# Patient Record
Sex: MALE | Race: WHITE | NOT HISPANIC OR LATINO | Employment: OTHER | URBAN - METROPOLITAN AREA
[De-identification: names, ages, dates, MRNs, and addresses within clinical notes are randomized per-mention and may not be internally consistent; named-entity substitution may affect disease eponyms.]

---

## 2019-05-23 ENCOUNTER — TELEPHONE (OUTPATIENT)
Dept: PHARMACY | Facility: CLINIC | Age: 64
End: 2019-05-23

## 2019-05-23 NOTE — TELEPHONE ENCOUNTER
Informed Patient  that [facility] received prescription for Mavyret and prior authorization is required.  [facility] will be back in touch once insurance determination is received.  Patient reports that he has already been in touch with Mavyret PAP program as well.

## 2019-08-05 ENCOUNTER — TELEPHONE (OUTPATIENT)
Dept: PHARMACY | Facility: CLINIC | Age: 64
End: 2019-08-05

## 2019-08-08 NOTE — TELEPHONE ENCOUNTER
DOCUMENTATION ONLY:  Prior authorization for AG Epclusa does not required prior authorization with insurance company.     Co-pay: $0    Patient Assistance IS NOT required.     Forward to clinical pharmacist for consult & shipment.

## 2019-08-19 ENCOUNTER — TELEPHONE (OUTPATIENT)
Dept: PHARMACY | Facility: CLINIC | Age: 64
End: 2019-08-19

## 2019-08-19 NOTE — TELEPHONE ENCOUNTER
Patient will be dispensed authorized generic of Epclusa.  All references to Epclusa will be for the authorized generic.    Initial Epclusa consult completed on . Epclusa will be shipped on  to arrive at patient's home on  via [employer]. $0.00 copay. Patient will start Epclusa on . Address confirmed, CC on file. Confirmed 2 patient identifiers - name and . Therapy Appropriate.     Epclusa 400/100mg- Take one tablet by mouth daily x 12 weeks  Counseling was reviewed:   1. Patient MUST take Epclusa at the SAME time every day.   2. Patient MUST avoid acid reducers without consulting with myself or provider first. Antacids are to be spaced out at least 4 hours apart from Epclusa.  H2 Blocker is to be taken AT THE SAME TIME as Epclusa.    3. Potential Side effects include: nausea, headaches, insomnia and fatigue.   Headache: Patient may treat with OTC remedies. If Tylenol is used, dose should not exceed 2000mg per day.    4. Medication list reviewed. No DDIs or allergies noted. Patient MUST contact myself or provider prior to starting any new OTC, herbal, or prescription drugs to avoid potential DDIs.    DDI: Medication list reviewed and potential DDIs addressed.  Patient aware to hold Protonix while on Epclusa  Patient will take H2 blocker at the same time as Epclusa.    Discussed the importance of staying well hydrated while on therapy. Compliance stressed - patient to take missed doses as soon as remembered, but NOT to take 2 doses in one day. Patient will report questions or concerns to myself or practitioner. Patient verbalizes understanding. Patient plans to start Epclusa on .  Consultation included: indication; goals of treatment; administration; storage and handling; side effects; how to handle side effects; the importance of compliance; how to handle missed doses; the importance of laboratory monitoring; the importance of keeping all follow up appointments.  Patient understands to report any  medication changes to [facility] and provider. All questions answered and addressed to patients satisfaction. I will f/u with her in 1 week from start, [facility] to contact patient in 3 weeks for refills.

## 2019-08-26 ENCOUNTER — TELEPHONE (OUTPATIENT)
Dept: PHARMACY | Facility: CLINIC | Age: 64
End: 2019-08-26

## 2019-08-26 NOTE — TELEPHONE ENCOUNTER
Patient reports having nausea and diarrhea starting Epclusa.  Patient states symptoms started Sunday.  Patient reports that nausea has subsided but patient is still having with diarrhea.  PAtient instructed to take Imodium top help with diarrhea for now and report if diarrhea worsens. Patient also informed that diarrhea/nausea is probably not from Epclusa but patient probably has a stomach virus.  Patient verbalized understanding and will call back if symtpoms worsen.

## 2019-09-04 ENCOUNTER — TELEPHONE (OUTPATIENT)
Dept: PHARMACY | Facility: CLINIC | Age: 64
End: 2019-09-04

## 2019-09-04 NOTE — TELEPHONE ENCOUNTER
Epclusa 7 Day Touchbase - Name/ confirmed.  Confirmed patient started medication as instructed on .  Patient confirms that they are taking Epclusa every day at the same time each day.  Patient denies skipping or missing doses.  Patient reports that nausea and diarrhea has resolved and he doesn't think it was related to Epclusa. Patient reports no new medications, otc remedies, or allergies. Patient advised to call [facility] and provider if any issues arise.  No questions or concerns at this time. Patient aware [facility] will reach out ~ 7 days before refill is due.

## 2019-09-13 ENCOUNTER — TELEPHONE (OUTPATIENT)
Dept: PHARMACY | Facility: CLINIC | Age: 64
End: 2019-09-13

## 2019-09-13 NOTE — TELEPHONE ENCOUNTER
Epclusa refill (2 of 3) confirmed and follow up completed. We will ship Epclusa refill on  via [employer] to arrive on . $0 copay- 004. Confirmed 2 patient identifiers - name and .     Patient has ~5 doses of Epclusa remaining and takes it at the same time daily.  Pt reports they are not having any side effects so far. No missed doses, no new medications, no new allergies or health conditions reported at this time. Has been taking Zantac for GERD symptoms and reports it works okay for him. All questions answered and addressed to patients satisfaction. Pt verbalized understanding.

## 2019-10-14 ENCOUNTER — TELEPHONE (OUTPATIENT)
Dept: PHARMACY | Facility: CLINIC | Age: 64
End: 2019-10-14

## 2019-10-14 NOTE — TELEPHONE ENCOUNTER
AG Epclusa (3 of 3)  refill confirmed. We will ship AG Epclusa refill on 10/15/19 via [employer] to arrive on 10/16/19. $0.00 copay- 004. Confirmed 2 patient identifiers - name and .     Patient has 4 doses of AG Epclusa remaining and takes it around ~7am daily.  Pt reports some heartburn. He stopped taking Zantac and switched to Rolaids. Reinforced that if he takes the Zantac it needs to be taken at the same time as the AG Epclusa, but if he is taking Rolaids it needs to be spaced from the AG Epclusa by 4 hours. No missed doses, no new medications, no new allergies or health conditions reported at this time. All questions answered and addressed to patients satisfaction. Pt verbalized understanding.

## 2019-12-09 PROBLEM — I10 ESSENTIAL HYPERTENSION: Status: ACTIVE | Noted: 2018-09-10

## 2019-12-09 PROBLEM — Z12.9 SCREENING FOR CANCER: Status: ACTIVE | Noted: 2019-12-09

## 2019-12-09 PROBLEM — G25.0 ESSENTIAL TREMOR: Status: ACTIVE | Noted: 2018-09-10

## 2019-12-09 PROBLEM — F32.9 MAJOR DEPRESSION, CHRONIC: Status: ACTIVE | Noted: 2018-06-11

## 2019-12-09 PROBLEM — F10.10 ALCOHOL ABUSE: Status: ACTIVE | Noted: 2018-06-11

## 2019-12-09 PROBLEM — F17.200 SMOKER: Status: ACTIVE | Noted: 2018-06-11

## 2022-08-31 PROBLEM — Z23 PNEUMOCOCCAL VACCINATION ADMINISTERED AT CURRENT VISIT: Status: ACTIVE | Noted: 2022-08-31

## 2022-08-31 PROBLEM — Z00.00 HEALTHCARE MAINTENANCE: Status: ACTIVE | Noted: 2022-08-31

## 2022-08-31 PROBLEM — R19.7 DIARRHEA: Status: ACTIVE | Noted: 2022-08-31

## 2022-08-31 PROBLEM — N20.0 NEPHROLITHIASIS: Status: ACTIVE | Noted: 2022-08-31

## 2022-08-31 PROBLEM — Z12.11 ENCOUNTER FOR SCREENING COLONOSCOPY: Status: ACTIVE | Noted: 2022-08-31

## 2022-08-31 PROBLEM — Z23 NEED FOR SHINGLES VACCINE: Status: ACTIVE | Noted: 2022-08-31

## 2022-08-31 PROBLEM — Z13.1 DIABETES MELLITUS SCREENING: Status: ACTIVE | Noted: 2022-08-31

## 2022-12-05 PROBLEM — Z13.1 DIABETES MELLITUS SCREENING: Status: RESOLVED | Noted: 2022-08-31 | Resolved: 2022-12-05

## 2022-12-05 PROBLEM — Z00.00 HEALTHCARE MAINTENANCE: Status: RESOLVED | Noted: 2022-08-31 | Resolved: 2022-12-05

## 2022-12-13 PROBLEM — K63.5 POLYP OF SIGMOID COLON: Status: ACTIVE | Noted: 2022-12-13

## 2022-12-13 PROBLEM — R63.4 UNINTENTIONAL WEIGHT LOSS: Status: ACTIVE | Noted: 2022-12-13

## 2022-12-13 PROBLEM — K63.5 POLYP OF ASCENDING COLON: Status: ACTIVE | Noted: 2022-12-13

## 2023-03-10 PROBLEM — R79.89 ABNORMAL CBC: Status: ACTIVE | Noted: 2023-03-10

## 2023-03-10 PROBLEM — Z23 NEED FOR TDAP VACCINATION: Status: ACTIVE | Noted: 2023-03-10

## 2023-03-10 PROBLEM — E87.1 HYPONATREMIA: Status: ACTIVE | Noted: 2023-03-10

## 2023-05-12 PROBLEM — R11.0 NAUSEA: Status: ACTIVE | Noted: 2023-05-12

## 2023-05-12 PROBLEM — R42 DIZZINESS: Status: ACTIVE | Noted: 2023-05-12

## 2023-05-12 PROBLEM — R53.83 FATIGUE: Status: ACTIVE | Noted: 2023-05-12

## 2023-05-12 PROBLEM — Z12.5 SCREENING PSA (PROSTATE SPECIFIC ANTIGEN): Status: ACTIVE | Noted: 2023-05-12

## 2023-05-12 PROBLEM — Z13.6 ENCOUNTER FOR ABDOMINAL AORTIC ANEURYSM SCREENING: Status: ACTIVE | Noted: 2023-05-12

## 2023-05-12 PROBLEM — Z78.9 DRINKS BEER: Status: ACTIVE | Noted: 2023-05-12

## 2023-05-12 PROBLEM — F10.10 ALCOHOL ABUSE, DAILY USE: Status: ACTIVE | Noted: 2023-05-12

## 2023-06-12 PROBLEM — Z00.00 HEALTHCARE MAINTENANCE: Status: RESOLVED | Noted: 2022-08-31 | Resolved: 2023-06-12

## 2023-06-20 PROBLEM — D72.820 LYMPHOCYTOSIS: Status: ACTIVE | Noted: 2023-06-20

## 2023-08-14 PROBLEM — Z13.6 ENCOUNTER FOR ABDOMINAL AORTIC ANEURYSM SCREENING: Status: RESOLVED | Noted: 2023-05-12 | Resolved: 2023-08-14
